# Patient Record
Sex: MALE | URBAN - METROPOLITAN AREA
[De-identification: names, ages, dates, MRNs, and addresses within clinical notes are randomized per-mention and may not be internally consistent; named-entity substitution may affect disease eponyms.]

---

## 2023-04-05 ENCOUNTER — OFFICE (OUTPATIENT)
Dept: URBAN - METROPOLITAN AREA CLINIC 28 | Facility: CLINIC | Age: 63
Setting detail: OPHTHALMOLOGY
End: 2023-04-05
Payer: COMMERCIAL

## 2023-04-05 DIAGNOSIS — H25.13: ICD-10-CM

## 2023-04-05 DIAGNOSIS — H43.393: ICD-10-CM

## 2023-04-05 PROCEDURE — 92202 OPSCPY EXTND ON/MAC DRAW: CPT | Performed by: OPHTHALMOLOGY

## 2023-04-05 PROCEDURE — 92004 COMPRE OPH EXAM NEW PT 1/>: CPT | Performed by: OPHTHALMOLOGY

## 2023-04-05 ASSESSMENT — AXIALLENGTH_DERIVED
OD_AL: 23.2275
OS_AL: 23.3618

## 2023-04-05 ASSESSMENT — CONFRONTATIONAL VISUAL FIELD TEST (CVF)
OD_FINDINGS: FULL
OS_FINDINGS: FULL

## 2023-04-05 ASSESSMENT — TONOMETRY
OS_IOP_MMHG: 13
OD_IOP_MMHG: 16

## 2023-04-05 ASSESSMENT — VISUAL ACUITY
OD_BCVA: 20/20
OS_BCVA: 20/25-2

## 2023-04-05 ASSESSMENT — KERATOMETRY
OD_K2POWER_DIOPTERS: 44.50
OS_K1POWER_DIOPTERS: 43.75
OS_AXISANGLE_DEGREES: 103
OD_AXISANGLE_DEGREES: 044
OD_K1POWER_DIOPTERS: 44.25
OS_K2POWER_DIOPTERS: 44.25

## 2023-04-05 ASSESSMENT — SPHEQUIV_DERIVED
OS_SPHEQUIV: 0.125
OD_SPHEQUIV: 0.125

## 2023-04-05 ASSESSMENT — REFRACTION_AUTOREFRACTION
OS_SPHERE: +0.25
OD_SPHERE: +0.25
OS_AXIS: 116
OD_CYLINDER: -0.25
OS_CYLINDER: -0.25
OD_AXIS: 011

## 2024-01-11 VITALS
HEART RATE: 56 BPM | DIASTOLIC BLOOD PRESSURE: 74 MMHG | HEIGHT: 69 IN | WEIGHT: 220.02 LBS | OXYGEN SATURATION: 95 % | TEMPERATURE: 97 F | SYSTOLIC BLOOD PRESSURE: 119 MMHG

## 2024-01-11 RX ORDER — CHLORHEXIDINE GLUCONATE 213 G/1000ML
1 SOLUTION TOPICAL ONCE
Refills: 0 | Status: DISCONTINUED | OUTPATIENT
Start: 2024-01-16 | End: 2024-01-30

## 2024-01-11 NOTE — H&P ADULT - NSHPLABSRESULTS_GEN_ALL_CORE
15.1   4.61  )-----------( 165      ( 16 Jan 2024 07:01 )             44.2               PT/INR - ( 16 Jan 2024 07:01 )   PT: 10.9 sec;   INR: 0.95          PTT - ( 16 Jan 2024 07:01 )  PTT:32.8 sec

## 2024-01-11 NOTE — H&P ADULT - ASSESSMENT
64 y/o M, with PMHx of HTN, HLD who presents to [facility] for LHC with possible intervention in light of patient's risk factors, CCS class II angina equivalent symptoms, and abnormal MPI.    EKG: sinus bradycardia @ 52 bpm, with t wave inversion in III.  ASA: II	  Mallampati class: III  Anginal Class: II    -No Known Allergies    -H/H = 15.1/44.2  . Pt denies BRBPR, hematuria, hematochezia, melena. Pt endorses compliance w/ home aspirin stating last dose was today 1/16/24. Pt loaded w/ Plavix 600 mg x1.  -EF=56%. Euvolemic on exam. IV NS @ 250 cc bolus followed by 75 cc/hr x 2 hrs started pre procedure    Sedation Plan:   Moderate  Patient Is Suitable Candidate For Sedation?     Yes    Risks & benefits of procedure and alternative therapy have been explained to the patient including but not limited to: allergic reaction, bleeding with possible need for blood transfusion, infection, renal and vascular compromise, limb damage, arrhythmia, stroke, vessel dissection/perforation, myocardial infarction, and emergent CABG. Informed consent obtained at bedside and included in chart.  62 y/o M, with PMHx of HTN, HLD who presents to [facility] for LHC with possible intervention in light of patient's risk factors, CCS class II angina equivalent symptoms, and abnormal MPI.    EKG: sinus bradycardia @ 52 bpm, with t wave inversion in III.  ASA: II	  Mallampati class: III  Anginal Class: II    -No Known Allergies    -H/H = 15.1/44.2  . Pt denies BRBPR, hematuria, hematochezia, melena. Pt endorses compliance w/ home aspirin stating last dose was today 1/16/24. Pt loaded w/ Plavix 600 mg x1.  -EF=56%. Euvolemic on exam. IV NS @ 250 cc bolus followed by 75 cc/hr x 2 hrs started pre procedure    Sedation Plan:   Moderate  Patient Is Suitable Candidate For Sedation?     Yes    Risks & benefits of procedure and alternative therapy have been explained to the patient including but not limited to: allergic reaction, bleeding with possible need for blood transfusion, infection, renal and vascular compromise, limb damage, arrhythmia, stroke, vessel dissection/perforation, myocardial infarction, and emergent CABG. Informed consent obtained at bedside and included in chart.  64 y/o M, with PMHx of HTN, HLD who presents to [facility] for LHC with possible intervention in light of patient's risk factors, CCS class II angina equivalent symptoms, and abnormal MPI.    EKG: sinus bradycardia @ 52 bpm, with t wave inversion in III.  ASA: II	  Mallampati class: III  Anginal Class: II    -No Known Allergies    -H/H = 15.1/44.2  . Pt denies BRBPR, hematuria, hematochezia, melena. Pt endorses compliance w/ home aspirin stating last dose was today 1/16/24. Pt loaded w/ Plavix 600 mg x1.  -EF=56%. Euvolemic on exam. IV NS @ 250 cc bolus followed by 75 cc/hr x 2 hrs started pre procedure    Sedation Plan:   Moderate  Patient Is Suitable Candidate For Sedation?     Yes    Risks & benefits of procedure and alternative therapy have been explained to the patient including but not limited to: allergic reaction, bleeding with possible need for blood transfusion, infection, renal and vascular compromise, limb damage, arrhythmia, stroke, vessel dissection/perforation, myocardial infarction, and emergent CABG. Informed consent obtained at bedside and included in chart.  64 y/o M, with PMHx of HTN, HLD who presents to [facility] for LHC with possible intervention in light of patient's risk factors, CCS class II angina equivalent symptoms, and abnormal MPI.    EKG: sinus bradycardia @ 52 bpm, with t wave inversion in III.  ASA: II	  Mallampati class: III  Anginal Class: II    -No Known Allergies    -H/H = 15.1/44.2  . Pt denies BRBPR, hematuria, hematochezia, melena. Pt endorses compliance w/ home aspirin stating last dose was today 1/16/24. Pt loaded w/ Plavix 600 mg x1.  -EF=56%. Euvolemic on exam. IV NS @ 250 cc bolus followed by 75 cc/hr x 2 hrs started pre procedure    Sedation Plan:   Moderate  Patient Is Suitable Candidate For Sedation?     Yes    Risks & benefits of procedure and alternative therapy have been explained to the patient including but not limited to: allergic reaction, bleeding with possible need for blood transfusion, infection, renal and vascular compromise, limb damage, arrhythmia, stroke, vessel dissection/perforation, myocardial infarction, and emergent CABG. Informed consent obtained at bedside and included in chart.

## 2024-01-11 NOTE — H&P ADULT - NSHPOUTPATIENTPROVIDERS_GEN_ALL_CORE
BERKLEYW met with patient and patient has no additional needs.    SUMMER Wilson    
Dr. Altamirano (Cardiologist)

## 2024-01-11 NOTE — H&P ADULT - HISTORY OF PRESENT ILLNESS
Cardiologist: Dr. Altamirano   Pharmacy:   Escort:       Pt is a 64 y/o M, with PMHx of HTN, HLD, who presented to his cardiologist, Dr. Altamirano, with complaints of ___/10 chest pain, described as ____ for the past ____. Patient also complains of dizziness while walking up stairs. Patient denies SOB, palpitations, orthopnea, LE edema, syncope, n/v, diaphoresis, claudication, fever, chills, recent travel, or sick contacts.   EKG 01/04/2023: sinus bradycardia @ 52 bpm, with t wave inversion in III. MPI 11/16/2023: Overall function is abnormal with regional wall motion abnormalities. Myocardial perfusion is abnormal. Reversible moderate defect in the apical region. Reversible mild defect in the anterior and septal regions. StressLVEF > 70%.; ECHO 11/16/2023: LV cavity is normal in size. Mild concentric LVH. Grade I DD. LA cavity is mildly dilated. Right atrial cavity is normal in size. EF: 56%.     In light of patient's risk factors, CCS class III angina equivalent symptoms, and abnormal MPI pt to undergo cardiac catheterization with possible intervention if clinically indicated.  Cardiologist: Dr. Altamirano   Pharmacy:   Escort:       Pt is a 62 y/o M, with PMHx of HTN, HLD, who presented to his cardiologist, Dr. Altamirano, with complaints of ___/10 chest pain, described as ____ for the past ____. Patient also complains of dizziness while walking up stairs. Patient denies SOB, palpitations, orthopnea, LE edema, syncope, n/v, diaphoresis, claudication, fever, chills, recent travel, or sick contacts.   EKG 01/04/2023: sinus bradycardia @ 52 bpm, with t wave inversion in III. MPI 11/16/2023: Overall function is abnormal with regional wall motion abnormalities. Myocardial perfusion is abnormal. Reversible moderate defect in the apical region. Reversible mild defect in the anterior and septal regions. StressLVEF > 70%.; ECHO 11/16/2023: LV cavity is normal in size. Mild concentric LVH. Grade I DD. LA cavity is mildly dilated. Right atrial cavity is normal in size. EF: 56%.     In light of patient's risk factors, CCS class III angina equivalent symptoms, and abnormal MPI pt to undergo cardiac catheterization with possible intervention if clinically indicated.  Cardiologist: Dr. Altamirano   Pharmacy: [address]  Escort: Wife      Pt is a 64 y/o M, with PMHx of HTN, HLD, who presented to his cardiologist, Dr. Altamirano, with complaints of dizziness and shortness of breath after running up stairs. Pt denies chest pain, palpitations, orthopnea, LE edema, syncope, n/v, diaphoresis, fever and chills. Pt had COVID a few weeks ago but denies any illness currently.     EKG 01/04/2023: sinus bradycardia @ 52 bpm, with t wave inversion in III. MPI 11/16/2023: Overall function is abnormal with regional wall motion abnormalities. Myocardial perfusion is abnormal. Reversible moderate defect in the apical region. Reversible mild defect in the anterior and septal regions. Stress LVEF > 70%.; ECHO 11/16/2023: LV cavity is normal in size. Mild concentric LVH. Grade I DD. LA cavity is mildly dilated. Right atrial cavity is normal in size. EF: 56%.     In light of patient's risk factors, CCS class II angina equivalent symptoms, and abnormal MPI pt to undergo cardiac catheterization with possible intervention if clinically indicated.  Cardiologist: Dr. Altamirano   Pharmacy: [address]  Escort: Wife      Pt is a 64 y/o M, with PMHx of HTN, HLD, who presented to his cardiologist, Dr. Altamirano, with complaints of dizziness and shortness of breath after running up stairs. Pt denies chest pain, palpitations, orthopnea, LE edema, syncope, n/v, diaphoresis, fever and chills. Pt had COVID a few weeks ago but denies any illness currently.     EKG 01/04/2023: sinus bradycardia @ 52 bpm, with t wave inversion in III. MPI 11/16/2023: Overall function is abnormal with regional wall motion abnormalities. Myocardial perfusion is abnormal. Reversible moderate defect in the apical region. Reversible mild defect in the anterior and septal regions. Stress LVEF > 70%.; ECHO 11/16/2023: LV cavity is normal in size. Mild concentric LVH. Grade I DD. LA cavity is mildly dilated. Right atrial cavity is normal in size. EF: 56%.     In light of patient's risk factors, CCS class II angina equivalent symptoms, and abnormal MPI pt to undergo cardiac catheterization with possible intervention if clinically indicated.  Cardiologist: Dr. Altamirano   Pharmacy: [address]  Escort: Wife      Pt is a 62 y/o M, with PMHx of HTN, HLD, who presented to his cardiologist, Dr. Altamirano, with complaints of dizziness and shortness of breath after running up stairs. Pt denies chest pain, palpitations, orthopnea, LE edema, syncope, n/v, diaphoresis, fever and chills. Pt had COVID a few weeks ago but denies any illness currently.     EKG 01/04/2023: sinus bradycardia @ 52 bpm, with t wave inversion in III. MPI 11/16/2023: Overall function is abnormal with regional wall motion abnormalities. Myocardial perfusion is abnormal. Reversible moderate defect in the apical region. Reversible mild defect in the anterior and septal regions. Stress LVEF > 70%.; ECHO 11/16/2023: LV cavity is normal in size. Mild concentric LVH. Grade I DD. LA cavity is mildly dilated. Right atrial cavity is normal in size. EF: 56%.     In light of patient's risk factors, CCS class II angina equivalent symptoms, and abnormal MPI pt to undergo cardiac catheterization with possible intervention if clinically indicated.  Cardiologist: Dr. Altamirano   Pharmacy: [address]  Escort: Wife      Pt is a 62 y/o M, with PMHx of HTN, HLD, who presented to his cardiologist, Dr. Altamirano, with complaints of dizziness and shortness of breath after running up stairs. Pt denies chest pain, palpitations, orthopnea, LE edema, syncope, n/v, diaphoresis, fever and chills. Pt had COVID a few weeks ago but denies any illness currently.     EKG 01/04/2023: sinus bradycardia @ 52 bpm, with t wave inversion in III. MPI 11/16/2023: Overall function is abnormal with regional wall motion abnormalities. Myocardial perfusion is abnormal. Reversible moderate defect in the apical region. Reversible mild defect in the anterior and septal regions. Stress LVEF > 70%.; ECHO 11/16/2023: LV cavity is normal in size. Mild concentric LVH. Grade I DD. LA cavity is mildly dilated. Right atrial cavity is normal in size. EF: 56%.     In light of patient's risk factors, CCS class II angina equivalent symptoms, and abnormal MPI pt to undergo cardiac catheterization with possible intervention if clinically indicated.

## 2024-01-16 ENCOUNTER — OUTPATIENT (OUTPATIENT)
Dept: OUTPATIENT SERVICES | Facility: HOSPITAL | Age: 64
LOS: 1 days | Discharge: HOME | End: 2024-01-16
Payer: COMMERCIAL

## 2024-01-16 DIAGNOSIS — Z90.49 ACQUIRED ABSENCE OF OTHER SPECIFIED PARTS OF DIGESTIVE TRACT: Chronic | ICD-10-CM

## 2024-01-16 LAB
A1C WITH ESTIMATED AVERAGE GLUCOSE RESULT: 6.5 % — HIGH (ref 4–5.6)
ALBUMIN SERPL ELPH-MCNC: 3.9 G/DL — SIGNIFICANT CHANGE UP (ref 3.3–5)
ALP SERPL-CCNC: 74 U/L — SIGNIFICANT CHANGE UP (ref 40–120)
ALT FLD-CCNC: 45 U/L — SIGNIFICANT CHANGE UP (ref 10–45)
ANION GAP SERPL CALC-SCNC: 10 MMOL/L — SIGNIFICANT CHANGE UP (ref 5–17)
APTT BLD: 32.8 SEC — SIGNIFICANT CHANGE UP (ref 24.5–35.6)
AST SERPL-CCNC: 35 U/L — SIGNIFICANT CHANGE UP (ref 10–40)
BASE EXCESS BLDV CALC-SCNC: 4.2 MMOL/L — HIGH (ref -2–3)
BASOPHILS # BLD AUTO: 0.05 K/UL — SIGNIFICANT CHANGE UP (ref 0–0.2)
BASOPHILS NFR BLD AUTO: 1.1 % — SIGNIFICANT CHANGE UP (ref 0–2)
BILIRUB SERPL-MCNC: 0.4 MG/DL — SIGNIFICANT CHANGE UP (ref 0.2–1.2)
BUN SERPL-MCNC: 16 MG/DL — SIGNIFICANT CHANGE UP (ref 7–23)
CA-I SERPL-SCNC: 1.18 MMOL/L — SIGNIFICANT CHANGE UP (ref 1.15–1.33)
CALCIUM SERPL-MCNC: 8.9 MG/DL — SIGNIFICANT CHANGE UP (ref 8.4–10.5)
CHLORIDE SERPL-SCNC: 104 MMOL/L — SIGNIFICANT CHANGE UP (ref 96–108)
CHOLEST SERPL-MCNC: 131 MG/DL — SIGNIFICANT CHANGE UP
CK MB CFR SERPL CALC: 2.6 NG/ML — SIGNIFICANT CHANGE UP (ref 0–6.7)
CK SERPL-CCNC: 125 U/L — SIGNIFICANT CHANGE UP (ref 30–200)
CO2 BLDV-SCNC: 30.6 MMOL/L — HIGH (ref 22–26)
CO2 SERPL-SCNC: 26 MMOL/L — SIGNIFICANT CHANGE UP (ref 22–31)
COHGB MFR BLDV: 1.5 % — SIGNIFICANT CHANGE UP
CREAT SERPL-MCNC: 0.82 MG/DL — SIGNIFICANT CHANGE UP (ref 0.5–1.3)
EGFR: 100 ML/MIN/1.73M2 — SIGNIFICANT CHANGE UP
EOSINOPHIL # BLD AUTO: 0.13 K/UL — SIGNIFICANT CHANGE UP (ref 0–0.5)
EOSINOPHIL NFR BLD AUTO: 2.8 % — SIGNIFICANT CHANGE UP (ref 0–6)
ESTIMATED AVERAGE GLUCOSE: 140 MG/DL — HIGH (ref 68–114)
GAS PNL BLDV: 136 MMOL/L — SIGNIFICANT CHANGE UP (ref 136–145)
GLUCOSE BLDV-MCNC: 100 MG/DL — HIGH (ref 70–99)
GLUCOSE SERPL-MCNC: 107 MG/DL — HIGH (ref 70–99)
HCO3 BLDV-SCNC: 29 MMOL/L — SIGNIFICANT CHANGE UP (ref 22–29)
HCT VFR BLD CALC: 44.2 % — SIGNIFICANT CHANGE UP (ref 39–50)
HCT VFR BLDA CALC: 46 % — SIGNIFICANT CHANGE UP
HDLC SERPL-MCNC: 40 MG/DL — LOW
HGB BLD CALC-MCNC: 15.3 G/DL — SIGNIFICANT CHANGE UP (ref 12.6–17.4)
HGB BLD-MCNC: 15.1 G/DL — SIGNIFICANT CHANGE UP (ref 13–17)
IMM GRANULOCYTES NFR BLD AUTO: 0.2 % — SIGNIFICANT CHANGE UP (ref 0–0.9)
INR BLD: 0.95 — SIGNIFICANT CHANGE UP (ref 0.85–1.18)
ISTAT INR: 1.1 — SIGNIFICANT CHANGE UP (ref 0.88–1.16)
ISTAT PT: 13.1 SEC — HIGH (ref 10–12.9)
LIPID PNL WITH DIRECT LDL SERPL: 67 MG/DL — SIGNIFICANT CHANGE UP
LYMPHOCYTES # BLD AUTO: 1.67 K/UL — SIGNIFICANT CHANGE UP (ref 1–3.3)
LYMPHOCYTES # BLD AUTO: 36.2 % — SIGNIFICANT CHANGE UP (ref 13–44)
MAGNESIUM SERPL-MCNC: 2.1 MG/DL — SIGNIFICANT CHANGE UP (ref 1.6–2.6)
MCHC RBC-ENTMCNC: 30.6 PG — SIGNIFICANT CHANGE UP (ref 27–34)
MCHC RBC-ENTMCNC: 34.2 GM/DL — SIGNIFICANT CHANGE UP (ref 32–36)
MCV RBC AUTO: 89.7 FL — SIGNIFICANT CHANGE UP (ref 80–100)
METHGB MFR BLDV: 0.4 % — SIGNIFICANT CHANGE UP
MONOCYTES # BLD AUTO: 0.43 K/UL — SIGNIFICANT CHANGE UP (ref 0–0.9)
MONOCYTES NFR BLD AUTO: 9.3 % — SIGNIFICANT CHANGE UP (ref 2–14)
NEUTROPHILS # BLD AUTO: 2.32 K/UL — SIGNIFICANT CHANGE UP (ref 1.8–7.4)
NEUTROPHILS NFR BLD AUTO: 50.4 % — SIGNIFICANT CHANGE UP (ref 43–77)
NON HDL CHOLESTEROL: 91 MG/DL — SIGNIFICANT CHANGE UP
NRBC # BLD: 0 /100 WBCS — SIGNIFICANT CHANGE UP (ref 0–0)
PCO2 BLDV: 44 MMHG — SIGNIFICANT CHANGE UP (ref 42–55)
PH BLDV: 7.43 — SIGNIFICANT CHANGE UP (ref 7.32–7.43)
PLATELET # BLD AUTO: 165 K/UL — SIGNIFICANT CHANGE UP (ref 150–400)
PO2 BLDV: 56 MMHG — HIGH (ref 25–45)
POCT ISTAT CREATININE: 0.9 MG/DL — SIGNIFICANT CHANGE UP (ref 0.5–1.3)
POTASSIUM BLDV-SCNC: 3.5 MMOL/L — SIGNIFICANT CHANGE UP (ref 3.5–5.1)
POTASSIUM SERPL-MCNC: 3.5 MMOL/L — SIGNIFICANT CHANGE UP (ref 3.5–5.3)
POTASSIUM SERPL-SCNC: 3.5 MMOL/L — SIGNIFICANT CHANGE UP (ref 3.5–5.3)
PROT SERPL-MCNC: 6.2 G/DL — SIGNIFICANT CHANGE UP (ref 6–8.3)
PROTHROM AB SERPL-ACNC: 10.9 SEC — SIGNIFICANT CHANGE UP (ref 9.5–13)
RBC # BLD: 4.93 M/UL — SIGNIFICANT CHANGE UP (ref 4.2–5.8)
RBC # FLD: 12.5 % — SIGNIFICANT CHANGE UP (ref 10.3–14.5)
SAO2 % BLDV: 88 % — SIGNIFICANT CHANGE UP (ref 67–88)
SODIUM SERPL-SCNC: 140 MMOL/L — SIGNIFICANT CHANGE UP (ref 135–145)
TRIGL SERPL-MCNC: 121 MG/DL — SIGNIFICANT CHANGE UP
TROPONIN T, HIGH SENSITIVITY RESULT: 10 NG/L — SIGNIFICANT CHANGE UP (ref 0–51)
WBC # BLD: 4.61 K/UL — SIGNIFICANT CHANGE UP (ref 3.8–10.5)
WBC # FLD AUTO: 4.61 K/UL — SIGNIFICANT CHANGE UP (ref 3.8–10.5)

## 2024-01-16 PROCEDURE — 84484 ASSAY OF TROPONIN QUANT: CPT

## 2024-01-16 PROCEDURE — 82565 ASSAY OF CREATININE: CPT

## 2024-01-16 PROCEDURE — 83735 ASSAY OF MAGNESIUM: CPT

## 2024-01-16 PROCEDURE — C1769: CPT

## 2024-01-16 PROCEDURE — 80053 COMPREHEN METABOLIC PANEL: CPT

## 2024-01-16 PROCEDURE — 82553 CREATINE MB FRACTION: CPT

## 2024-01-16 PROCEDURE — 85025 COMPLETE CBC W/AUTO DIFF WBC: CPT

## 2024-01-16 PROCEDURE — C1887: CPT

## 2024-01-16 PROCEDURE — 83036 HEMOGLOBIN GLYCOSYLATED A1C: CPT

## 2024-01-16 PROCEDURE — 82550 ASSAY OF CK (CPK): CPT

## 2024-01-16 PROCEDURE — 85730 THROMBOPLASTIN TIME PARTIAL: CPT

## 2024-01-16 PROCEDURE — 36415 COLL VENOUS BLD VENIPUNCTURE: CPT

## 2024-01-16 PROCEDURE — 80061 LIPID PANEL: CPT

## 2024-01-16 PROCEDURE — 85610 PROTHROMBIN TIME: CPT

## 2024-01-16 PROCEDURE — 82803 BLOOD GASES ANY COMBINATION: CPT

## 2024-01-16 PROCEDURE — C1894: CPT

## 2024-01-16 PROCEDURE — 93458 L HRT ARTERY/VENTRICLE ANGIO: CPT

## 2024-01-16 RX ORDER — ATORVASTATIN CALCIUM 80 MG/1
1 TABLET, FILM COATED ORAL
Refills: 0 | DISCHARGE

## 2024-01-16 RX ORDER — ASPIRIN/CALCIUM CARB/MAGNESIUM 324 MG
81 TABLET ORAL DAILY
Refills: 0 | Status: DISCONTINUED | OUTPATIENT
Start: 2024-01-16 | End: 2024-01-16

## 2024-01-16 RX ORDER — SODIUM CHLORIDE 9 MG/ML
250 INJECTION INTRAMUSCULAR; INTRAVENOUS; SUBCUTANEOUS ONCE
Refills: 0 | Status: COMPLETED | OUTPATIENT
Start: 2024-01-16 | End: 2024-01-16

## 2024-01-16 RX ORDER — ASPIRIN/CALCIUM CARB/MAGNESIUM 324 MG
0 TABLET ORAL
Refills: 0 | DISCHARGE

## 2024-01-16 RX ORDER — SODIUM CHLORIDE 9 MG/ML
1000 INJECTION INTRAMUSCULAR; INTRAVENOUS; SUBCUTANEOUS
Refills: 0 | Status: DISCONTINUED | OUTPATIENT
Start: 2024-01-16 | End: 2024-01-30

## 2024-01-16 RX ORDER — METOPROLOL TARTRATE 50 MG
1 TABLET ORAL
Refills: 0 | DISCHARGE

## 2024-01-16 RX ORDER — CLOPIDOGREL BISULFATE 75 MG/1
600 TABLET, FILM COATED ORAL ONCE
Refills: 0 | Status: COMPLETED | OUTPATIENT
Start: 2024-01-16 | End: 2024-01-16

## 2024-01-16 RX ORDER — ISOSORBIDE MONONITRATE 60 MG/1
1 TABLET, EXTENDED RELEASE ORAL
Qty: 30 | Refills: 1
Start: 2024-01-16 | End: 2024-03-15

## 2024-01-16 RX ORDER — SODIUM CHLORIDE 9 MG/ML
500 INJECTION INTRAMUSCULAR; INTRAVENOUS; SUBCUTANEOUS
Refills: 0 | Status: COMPLETED | OUTPATIENT
Start: 2024-01-16 | End: 2024-01-16

## 2024-01-16 RX ORDER — POTASSIUM CHLORIDE 20 MEQ
40 PACKET (EA) ORAL ONCE
Refills: 0 | Status: COMPLETED | OUTPATIENT
Start: 2024-01-16 | End: 2024-01-16

## 2024-01-16 RX ORDER — AMLODIPINE BESYLATE 2.5 MG/1
1 TABLET ORAL
Refills: 0 | DISCHARGE

## 2024-01-16 RX ORDER — HYDROCHLOROTHIAZIDE 25 MG
1 TABLET ORAL
Refills: 0 | DISCHARGE

## 2024-01-16 RX ADMIN — SODIUM CHLORIDE 250 MILLILITER(S): 9 INJECTION INTRAMUSCULAR; INTRAVENOUS; SUBCUTANEOUS at 07:58

## 2024-01-16 RX ADMIN — Medication 40 MILLIEQUIVALENT(S): at 09:54

## 2024-01-16 RX ADMIN — SODIUM CHLORIDE 75 MILLILITER(S): 9 INJECTION INTRAMUSCULAR; INTRAVENOUS; SUBCUTANEOUS at 07:59

## 2024-01-16 RX ADMIN — SODIUM CHLORIDE 200 MILLILITER(S): 9 INJECTION INTRAMUSCULAR; INTRAVENOUS; SUBCUTANEOUS at 10:40

## 2024-01-16 RX ADMIN — SODIUM CHLORIDE 75 MILLILITER(S): 9 INJECTION INTRAMUSCULAR; INTRAVENOUS; SUBCUTANEOUS at 10:40

## 2024-01-16 RX ADMIN — CLOPIDOGREL BISULFATE 600 MILLIGRAM(S): 75 TABLET, FILM COATED ORAL at 07:57

## 2024-01-16 NOTE — PROGRESS NOTE ADULT - SUBJECTIVE AND OBJECTIVE BOX
Interventional Cardiology PA SDA Discharge Note    Patient without complaints. Ambulated and voided without difficulties    Afebrile, VSS    Ext:    Right Radial :   No hematoma,  no bleeding, dressing C/D/I      Pulses:    intact Radial pulse to baseline     A/P:   64 y/o M, with PMHx of HTN, HLD who presents to [facility] for LHC with possible intervention in light of patient's risk factors, CCS class II angina equivalent symptoms, and abnormal MPI.    S/p 01/16/24 Diagnostic LHC:  normal LM; LAD and LCx with minor irregularities; RCA - anterior high take-off, minor irregularities.  LVEDP 16mmHg; no AS. Access: Right Radial.     1.	Stable for discharge as per attending Dr. Altamirano after bed rest, IV Fluids, pt voids, groin/wrist stable and 30 minutes of ambulation.  2.	Follow-up with PMD/Cardiologist Dr. Jaimes in 1-2 weeks  3.	Discharged forms signed and copies in chart; Pt counseled on new medication changes of: to start Imdur 30mg daily, and to discontinue Amlodipine. Pt additionally counseled on following up with PCP regarding A1c of 6.5%; pt counseled on diet and lifestyle modifications.   Interventional Cardiology PA SDA Discharge Note    Patient without complaints. Ambulated and voided without difficulties    Afebrile, VSS    Ext:    Right Radial :   No hematoma,  no bleeding, dressing C/D/I      Pulses:    intact Radial pulse to baseline     A/P:   64 y/o M, with PMHx of HTN, HLD who presents to [facility] for LHC with possible intervention in light of patient's risk factors, CCS class II angina equivalent symptoms, and abnormal MPI.    S/p 01/16/24 Diagnostic LHC:  normal LM; LAD and LCx with minor irregularities; RCA - anterior high take-off, minor irregularities.  LVEDP 16mmHg; no AS. Access: Right Radial.     1.	Stable for discharge as per attending Dr. Altamirano after bed rest, IV Fluids, pt voids, groin/wrist stable and 30 minutes of ambulation.  2.	Follow-up with PMD/Cardiologist Dr. Jaimes in 1-2 weeks  3.	Discharged forms signed and copies in chart; Pt counseled on new medication changes of: to start Imdur 30mg daily, and to discontinue Amlodipine. Pt additionally counseled on following up with PCP regarding A1c of 6.5%; pt counseled on diet and lifestyle modifications.

## 2024-01-17 DIAGNOSIS — I25.110 ATHEROSCLEROTIC HEART DISEASE OF NATIVE CORONARY ARTERY WITH UNSTABLE ANGINA PECTORIS: ICD-10-CM

## 2024-01-17 DIAGNOSIS — R94.39 ABNORMAL RESULT OF OTHER CARDIOVASCULAR FUNCTION STUDY: ICD-10-CM
